# Patient Record
Sex: FEMALE | Race: WHITE
[De-identification: names, ages, dates, MRNs, and addresses within clinical notes are randomized per-mention and may not be internally consistent; named-entity substitution may affect disease eponyms.]

---

## 2020-10-28 ENCOUNTER — HOSPITAL ENCOUNTER (INPATIENT)
Dept: HOSPITAL 46 - FBCO | Age: 30
LOS: 1 days | Discharge: HOME | End: 2020-10-29
Attending: GENERAL PRACTICE | Admitting: GENERAL PRACTICE
Payer: COMMERCIAL

## 2020-10-28 VITALS — HEIGHT: 66.5 IN | WEIGHT: 293 LBS | BODY MASS INDEX: 46.53 KG/M2

## 2020-10-28 DIAGNOSIS — Z88.5: ICD-10-CM

## 2020-10-28 DIAGNOSIS — E66.9: ICD-10-CM

## 2020-10-28 DIAGNOSIS — Z3A.40: ICD-10-CM

## 2020-10-28 DIAGNOSIS — Z87.891: ICD-10-CM

## 2020-10-28 DIAGNOSIS — F32.9: ICD-10-CM

## 2020-10-28 DIAGNOSIS — Z86.19: ICD-10-CM

## 2020-10-28 PROCEDURE — 0KQM0ZZ REPAIR PERINEUM MUSCLE, OPEN APPROACH: ICD-10-PCS | Performed by: GENERAL PRACTICE

## 2020-10-28 PROCEDURE — 00HU33Z INSERTION OF INFUSION DEVICE INTO SPINAL CANAL, PERCUTANEOUS APPROACH: ICD-10-PCS

## 2020-10-28 PROCEDURE — 3E0R3BZ INTRODUCTION OF ANESTHETIC AGENT INTO SPINAL CANAL, PERCUTANEOUS APPROACH: ICD-10-PCS

## 2020-10-28 PROCEDURE — A9270 NON-COVERED ITEM OR SERVICE: HCPCS

## 2020-10-29 NOTE — PR
[facility]
                                    [address]
                                  [place]  [address]
_________________________________________________________________________________________
                                                                 Signed   
 
 
===================================
PP Progress Notes
===================================
Datetime Report Generated by CPN: 10/29/2020 08:46
   
SUBJECTIVE:  [id number]
Pain:  Within Normal Limits
Nausea/Vomiting:  Denies
Vital Signs:  [id number]
Vital Signs:  Reviewed; Within Normal Limits
Notable Details:  PP HGb/Hct = 11.4/33.9
Abdomen/Uterus:  Normal
Lochia:  Normal
Extremities:  Normal
IMPRESSION/PLAN/PROCEDURES:  [id number]
Impression:  Normal Postpartum Progression
Plan:  Discharge
Procedures:  None
Progress Notes:  Doing well, without complaint, ready to go home.
Signing Physician:  Glenn Musa MD
 
 
Copies:                                
~
 
 
 
 
 
 
 
 
 
 
 
 
 
 
 
 
 
 
 
*Electronically Signed*  10/29/20  0846  GLENN MUSA MD      
                                                                       
_________________________________________________________________________________________
PATIENT NAME:     MARISOL HARTMAN                  
MEDICAL RECORD #: [id number]                     PROGRESS NOTE                 
          ACCT #: [id number]  
DATE OF BIRTH:   [birth year]                                       
PHYSICIAN:   GLENN MUSA MD             RPT #: [id number]
REPORT IS CONFIDENTIAL AND NOT TO BE RELEASED WITHOUT AUTHORIZATION